# Patient Record
Sex: MALE | Race: OTHER
[De-identification: names, ages, dates, MRNs, and addresses within clinical notes are randomized per-mention and may not be internally consistent; named-entity substitution may affect disease eponyms.]

---

## 2018-09-27 ENCOUNTER — HOSPITAL ENCOUNTER (EMERGENCY)
Dept: HOSPITAL 72 - EMR | Age: 38
Discharge: HOME | End: 2018-09-27
Payer: SELF-PAY

## 2018-09-27 VITALS — WEIGHT: 172 LBS | BODY MASS INDEX: 22.8 KG/M2 | HEIGHT: 73 IN

## 2018-09-27 VITALS — SYSTOLIC BLOOD PRESSURE: 130 MMHG | DIASTOLIC BLOOD PRESSURE: 83 MMHG
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
## 2018-09-27 DIAGNOSIS — S39.012A: Primary | ICD-10-CM
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
## 2018-09-27 PROCEDURE — 99283 EMERGENCY DEPT VISIT LOW MDM: CPT

## 2018-09-27 NOTE — EMERGENCY ROOM REPORT
History of Present Illness


General


Chief Complaint:  Lower Back Pain or Injury


Source:  Patient





Present Illness


HPI


38-year-old male presents to the emergency department complaining of 9 out of 

10 in severity back pain that radiates across the lower back 4 days.  Patient 

reports that he has had similar symptoms several times a year for 2 years.  

Patient denies recent trauma or fall.  He denies strenuous activities, recent 

spinal procedures or history of cancer.  Patient denies fevers, chills, saddle 

anesthesia, urinary retention or urinary incontinence.  Patient states that he 

has been taking Tylenol and using icy hot patches which provided no relief.  He 

states his pain is exacerbated upon sitting in a chair or bending over.  

Patient reports pain is somewhat relieved with lying flat or standing up 

straight.


Allergies:  


Coded Allergies:  


     No Known Allergies (Unverified , 9/27/18)





Patient History


Past Medical History:  see triage record


Past Surgical History:  none


Pertinent Family History:  none


Reviewed Nursing Documentation:  PMH: Agreed; PSxH: Agreed





Nursing Documentation-PMH


Past Medical History:  No Stated History





Review of Systems


All Other Systems:  negative except mentioned in HPI





Physical Exam





Vital Signs








  Date Time  Temp Pulse Resp B/P (MAP) Pulse Ox O2 Delivery O2 Flow Rate FiO2


 


9/27/18 12:16 98.9 79 18 130/83 96 Room Air  





 99.0       








Sp02 EP Interpretation:  reviewed, normal


General Appearance:  no apparent distress, alert, GCS 15, non-toxic


Head:  normocephalic, atraumatic


ENT:  hearing grossly normal, normal voice


Neck:  full range of motion


Respiratory:  lungs clear, normal breath sounds, speaking full sentences


Cardiovascular #1:  regular rate, rhythm


Musculoskeletal:  back normal, gait/station normal, normal range of motion, 

tender - TTP to the lumbar paraspinal musculature, no midline spinous process 

ttp, FROM with some pain, ambulatory, no obvious step-off or deformities. no 

rashes, erythema, warmth or bruises.


Neurologic:  alert, oriented x3, responsive, motor strength/tone normal, 

sensory intact, normal gait, speech normal, grossly normal


Psychiatric:  judgement/insight normal


Skin:  normal color, no rash, warm/dry, well hydrated





Medical Decision Making


PA Attestation


Dr. Virgen is my supervising Physician whom patient management has been discussed 

with.


Diagnostic Impression:  


 Primary Impression:  


 Acute lumbosacral myofascial strain


 Qualified Codes:  S39.012A - Strain of muscle, fascia and tendon of lower back

, initial encounter


 Additional Impression:  


 Back pain


 Qualified Codes:  M54.5 - Low back pain


ER Course


38-year-old male presents to the emergency department complaining of 9 out of 

10 in severity back pain that radiates across the lower back 4 days.  Patient 

reports that he has had similar symptoms several times a year for 2 years.  

Patient denies recent trauma or fall.  He denies strenuous activities, recent 

spinal procedures or history of cancer.  Patient denies fevers, chills, saddle 

anesthesia, urinary retention or urinary incontinence.  Patient states that he 

has been taking Tylenol and using icy hot patches which provided no relief.  He 

states his pain is exacerbated upon sitting in a chair or bending over.  

Patient reports pain is somewhat relieved with lying flat or standing up 

straight.





Ddx considered: epidural abscess, fracture, sprain/strain, meningitis, spinal 

chord injury, sciatica, cauda equina, Pyelonephritis, renal calculi just to 

name a few.  





Vital signs reviewed and are WNL during ED visit.





Pt. is afebrile with no signs of infection


No new symptoms, and denies recent trauma.


No saddle anesthesia noted, Pt. denies incontinence 


Neurovascular is intact


 Mild  Tenderness to palpation to paraspinal muscles of the lower back with no 

midline tenderness. 


Pt. describes pain today as constant dull ache that is moderate and radiates 

across the lower back. 





ORDERS: none warranted at this time.  





INTERVENTIONS: 


- Lidoderm patch


-Motrin PO





-I do not identify an emergent condition at this time. With current presentation

,  pt. is stable for close outpatient follow up and conservative treatment.  D/

w pt. to return promptly to ED with worsening or new symptoms.- Pt. verbalizes' 

understanding and agreement with proposed treatment plan.proposed treatment 

plan.





DISCHARGE: At this time pt. is stable for d/c to home. Will provide printed 

patient care instructions, and any necessary prescriptions. Care plan and 

follow up instructions have been discussed with the patient prior to discharge.





Last Vital Signs








  Date Time  Temp Pulse Resp B/P (MAP) Pulse Ox O2 Delivery O2 Flow Rate FiO2


 


9/27/18 12:26 99.0  18 130/83 96 Room Air  





 99.0       


 


9/27/18 12:16  79      








Disposition:  HOME, SELF-CARE


Condition:  Stable


Scripts


Lidocaine (Lidoderm) 1 Each Adh..patch


1 PATCH TOPIC DAILY, #30 PATCH 0 Refills


   Patch(es) may remain in place for up to 12 hours in any 24-hour


   period.


   Prov: Bethany Gallo         9/27/18 


Ibuprofen* (MOTRIN*) 600 Mg Tablet


600 MG ORAL THREE TIMES A DAY, #30 TAB 0 Refills


   Prov: Bethany Gallo         9/27/18 


Methocarbamol* (ROBAXIN*) 500 Mg Tablet


1000 MG PO TID, #42 TAB 0 Refills


   Prov: Bethany Gallo         9/27/18


Referrals:  


NOT CHOSEN [facility]/MD,REFERRING (PCP)


Patient Instructions:  Lumbosacral Strain





Additional Instructions:  


Take medications as directed. 


 ** Follow up with a Primary Care Provider in 3-5 days, even if your symptoms 

have resolved. ** 


--Please review list of primary care clinics, if you do not already have a 

primary care provider





Return sooner to ED if new symptoms occur, or current symptoms become worse. 


Do not drink alcohol, drive, or operate heavy machinery while taking Robaxin ( 

Muscle Relaxers) as this may cause drowsiness. 











- Please note that this Emergency Department Report was dictated using [employer] technology software, occasionally this can lead to 

erroneous entry secondary to interpretation by the dictation equipment.











Bethany Gallo Sep 27, 2018 13:08